# Patient Record
Sex: FEMALE | Race: WHITE | NOT HISPANIC OR LATINO | Employment: UNEMPLOYED | URBAN - METROPOLITAN AREA
[De-identification: names, ages, dates, MRNs, and addresses within clinical notes are randomized per-mention and may not be internally consistent; named-entity substitution may affect disease eponyms.]

---

## 2024-09-11 ENCOUNTER — HOSPITAL ENCOUNTER (EMERGENCY)
Facility: HOSPITAL | Age: 34
Discharge: HOME | End: 2024-09-11
Payer: COMMERCIAL

## 2024-09-11 ENCOUNTER — APPOINTMENT (OUTPATIENT)
Dept: RADIOLOGY | Facility: HOSPITAL | Age: 34
End: 2024-09-11
Payer: COMMERCIAL

## 2024-09-11 VITALS
BODY MASS INDEX: 22.15 KG/M2 | HEIGHT: 63 IN | HEART RATE: 88 BPM | OXYGEN SATURATION: 97 % | RESPIRATION RATE: 20 BRPM | WEIGHT: 125 LBS | TEMPERATURE: 99.7 F

## 2024-09-11 DIAGNOSIS — S62.92XA LEFT HAND FRACTURE, CLOSED, INITIAL ENCOUNTER: Primary | ICD-10-CM

## 2024-09-11 PROCEDURE — 29125 APPL SHORT ARM SPLINT STATIC: CPT | Mod: LT | Performed by: PHYSICIAN ASSISTANT

## 2024-09-11 PROCEDURE — 73110 X-RAY EXAM OF WRIST: CPT | Mod: LEFT SIDE | Performed by: STUDENT IN AN ORGANIZED HEALTH CARE EDUCATION/TRAINING PROGRAM

## 2024-09-11 PROCEDURE — 73110 X-RAY EXAM OF WRIST: CPT | Mod: LT

## 2024-09-11 PROCEDURE — 99283 EMERGENCY DEPT VISIT LOW MDM: CPT

## 2024-09-11 RX ORDER — OXYCODONE AND ACETAMINOPHEN 5; 325 MG/1; MG/1
1 TABLET ORAL EVERY 6 HOURS PRN
Qty: 10 TABLET | Refills: 0 | Status: SHIPPED | OUTPATIENT
Start: 2024-09-11 | End: 2024-09-14

## 2024-09-11 ASSESSMENT — PAIN - FUNCTIONAL ASSESSMENT: PAIN_FUNCTIONAL_ASSESSMENT: 0-10

## 2024-09-11 ASSESSMENT — PAIN DESCRIPTION - LOCATION: LOCATION: WRIST

## 2024-09-11 ASSESSMENT — LIFESTYLE VARIABLES
HAVE YOU EVER FELT YOU SHOULD CUT DOWN ON YOUR DRINKING: NO
TOTAL SCORE: 0
EVER HAD A DRINK FIRST THING IN THE MORNING TO STEADY YOUR NERVES TO GET RID OF A HANGOVER: NO
EVER FELT BAD OR GUILTY ABOUT YOUR DRINKING: NO
HAVE PEOPLE ANNOYED YOU BY CRITICIZING YOUR DRINKING: NO

## 2024-09-11 ASSESSMENT — COLUMBIA-SUICIDE SEVERITY RATING SCALE - C-SSRS
1. IN THE PAST MONTH, HAVE YOU WISHED YOU WERE DEAD OR WISHED YOU COULD GO TO SLEEP AND NOT WAKE UP?: NO
6. HAVE YOU EVER DONE ANYTHING, STARTED TO DO ANYTHING, OR PREPARED TO DO ANYTHING TO END YOUR LIFE?: NO
2. HAVE YOU ACTUALLY HAD ANY THOUGHTS OF KILLING YOURSELF?: NO

## 2024-09-11 ASSESSMENT — PAIN SCALES - GENERAL: PAINLEVEL_OUTOF10: 8

## 2024-09-12 NOTE — ED PROVIDER NOTES
EMERGENCY MEDICINE EVALUATION NOTE    History of Present Illness     Chief Complaint:   Chief Complaint   Patient presents with    Wrist Injury     Walking today outside missed a step landed on L wrist.   Denies hitting head no loc       HPI: Corie Rodrigez is a 34 y.o. female presents with a chief complaint of fall.  She reports that she was walking down some steps onto her garage when she missed a step and landed awkwardly on the left wrist and hand area.  She is been having pain on the left lateral wrist down the hand since the fall.  Patient reports that she is decreased range of motion secondary to pain.  She did not take any at home for symptoms.  Patient denies any other injuries from the incident.  She did not hit her head during the fall.  Patient denies loss of consciousness.  Patient denies any previous surgeries or fractures to left upper extremity.    Previous History   No past medical history on file.  No past surgical history on file.  Social History     Tobacco Use    Smoking status: Never   Vaping Use    Vaping status: Never Used   Substance Use Topics    Alcohol use: Never     No family history on file.  Allergies   Allergen Reactions    Minocycline Shortness of breath     Current Outpatient Medications   Medication Instructions    oxyCODONE-acetaminophen (Percocet) 5-325 mg tablet 1 tablet, oral, Every 6 hours PRN       Physical Exam     Appearance: Alert, oriented , cooperative     Skin: Intact,  dry skin, no lesions, rash, petechiae or purpura.      Eyes: PERRLA, EOMs intact,  Conjunctiva pink     ENT: Hearing grossly intact. Pharynx clear     Neck: Supple. Trachea at midline.     Pulmonary: Clear bilaterally. No rales, rhonchi or wheezing. No accessory muscle use or stridor.     Cardiac: Normal rate and rhythm without murmur     Abdomen: Soft, nontender, active bowel sounds.     Musculoskeletal: Decreased range of motion of left wrist secondary to pain.  Patient is diffuse tenderness and  "swelling along lateral aspect of hand.  Intact sensation in the distal tips of all fingers.  Strong pulse left upper extremity.     Neurological:Cranial nerves II through XII are grossly intact, normal sensation, no weakness, no focal findings identified.     Results   Labs Reviewed - No data to display  XR wrist left 3+ views   Final Result   1. Acute mildly displaced oblique fracture 5th metacarpal diaphysis.   2. Acute nondisplaced oblique fracture of the 4th metacarpal neck.   3. Smoothly marginated osseous thickening along the distal ulnar   metadiaphysis that may relate to a healed fracture or chronic   subperiosteal hematoma from remote injury.        MACRO:   None.        Signed by: Norris Conde 9/11/2024 6:26 PM   Dictation workstation:   [id number]            ED Course & Medical Decision Making   Medications - No data to display  Heart Rate:  [88]   Temperature:  [37.6 °C (99.7 °F)]   Respirations:  [20]   Height:  [160 cm (5' 3\")]   Weight:  [56.7 kg (125 lb)]   Pulse Ox:  [97 %]    ED Course as of 09/11/24 2238   Wed Sep 11, 2024   1857 Updated the patient on the results.  Patient's x-ray does show fractures of the fifth and fourth metatarsal.  Patient not have any obvious new wrist fractures.  Discussed plan of care going forward.  Patient had splint placed at this time.  Patient will be discharged home to follow-up orthopedics as an outpatient.  Patient had pain medication sent over to her pharmacy.  She was encouraged to follow-up with orthopedics return here immediately with any worsening symptoms.  We have discussed signs or symptoms for splint removal for concern for swelling. [CJ]      ED Course User Index  [CJ] Jb Cunha PA-C         Diagnoses as of 09/11/24 2238   Left hand fracture, closed, initial encounter       Procedures   Orthopaedic Injury Treatment - Upper Extremity    Performed by: Jb Cunha PA-C  Authorized by: Jb Cunha PA-C    Consent:     Consent obtained:  " Verbal    Consent given by:  Patient    Risks discussed:  Nerve damage, restricted joint movement, vascular damage, recurrent dislocation and stiffness    Alternatives discussed:  No treatment  Universal protocol:     Patient identity confirmed:  Verbally with patient  Location:     Location:  Wrist    Wrist location:  L wrist  Pre-procedure details:     Pre-procedure imaging:  X-ray    Imaging findings: fracture present      Distal perfusion: normal    Sedation:     Sedation type:  None  Anesthesia:     Anesthesia method:  None  Procedure details:     Immobilization:  Splint    Splint type:  Ulnar gutter    Supplies used:  Elastic bandage, cotton padding and fiberglass  Post-procedure details:     Neurological function: normal      Distal perfusion: normal      Range of motion: unchanged      Procedure completion:  Tolerated well, no immediate complications      Diagnosis     1. Left hand fracture, closed, initial encounter        Disposition   Discharged     ED Prescriptions       Medication Sig Dispense Start Date End Date Auth. Provider    oxyCODONE-acetaminophen (Percocet) 5-325 mg tablet Take 1 tablet by mouth every 6 hours if needed for severe pain (7 - 10) for up to 3 days. 10 tablet 9/11/2024 9/14/2024 Jb Cunha PA-C            Disclaimer: This note was dictated by speech recognition. Minor errors in transcription may be present. Please call if questions.       Jb Cunha PA-C  09/11/24 [id number]

## 2024-09-16 ENCOUNTER — OFFICE VISIT (OUTPATIENT)
Dept: ORTHOPEDIC SURGERY | Facility: HOSPITAL | Age: 34
End: 2024-09-16
Payer: COMMERCIAL

## 2024-09-16 ENCOUNTER — EVALUATION (OUTPATIENT)
Dept: OCCUPATIONAL THERAPY | Facility: HOSPITAL | Age: 34
End: 2024-09-16
Payer: COMMERCIAL

## 2024-09-16 VITALS — HEIGHT: 63 IN | WEIGHT: 125 LBS | BODY MASS INDEX: 22.15 KG/M2

## 2024-09-16 DIAGNOSIS — S62.307A CLOSED FRACTURE OF FIFTH METACARPAL BONE OF LEFT HAND, UNSPECIFIED FRACTURE MORPHOLOGY, INITIAL ENCOUNTER: ICD-10-CM

## 2024-09-16 DIAGNOSIS — S62.305A CLOSED FRACTURE OF FOURTH METACARPAL BONE OF LEFT HAND, UNSPECIFIED FRACTURE MORPHOLOGY, INITIAL ENCOUNTER: ICD-10-CM

## 2024-09-16 DIAGNOSIS — S62.307A CLOSED FRACTURE OF FIFTH METACARPAL BONE OF LEFT HAND: ICD-10-CM

## 2024-09-16 DIAGNOSIS — S62.305A CLOSED FRACTURE OF FOURTH METACARPAL BONE OF LEFT HAND, UNSPECIFIED FRACTURE MORPHOLOGY, INITIAL ENCOUNTER: Primary | ICD-10-CM

## 2024-09-16 DIAGNOSIS — S62.305A: ICD-10-CM

## 2024-09-16 DIAGNOSIS — M25.60 STIFFNESS IN JOINT: Primary | ICD-10-CM

## 2024-09-16 DIAGNOSIS — S62.92XA LEFT HAND FRACTURE, CLOSED, INITIAL ENCOUNTER: ICD-10-CM

## 2024-09-16 PROCEDURE — 99204 OFFICE O/P NEW MOD 45 MIN: CPT | Performed by: ORTHOPAEDIC SURGERY

## 2024-09-16 PROCEDURE — 3008F BODY MASS INDEX DOCD: CPT | Performed by: ORTHOPAEDIC SURGERY

## 2024-09-16 PROCEDURE — 99214 OFFICE O/P EST MOD 30 MIN: CPT | Mod: 25 | Performed by: ORTHOPAEDIC SURGERY

## 2024-09-16 PROCEDURE — 26600 TREAT METACARPAL FRACTURE: CPT | Performed by: ORTHOPAEDIC SURGERY

## 2024-09-16 PROCEDURE — 97760 ORTHOTIC MGMT&TRAING 1ST ENC: CPT | Mod: GO | Performed by: OCCUPATIONAL THERAPIST

## 2024-09-16 PROCEDURE — 97165 OT EVAL LOW COMPLEX 30 MIN: CPT | Mod: GO | Performed by: OCCUPATIONAL THERAPIST

## 2024-09-16 ASSESSMENT — PATIENT HEALTH QUESTIONNAIRE - PHQ9
SUM OF ALL RESPONSES TO PHQ9 QUESTIONS 1 AND 2: 0
2. FEELING DOWN, DEPRESSED OR HOPELESS: NOT AT ALL
1. LITTLE INTEREST OR PLEASURE IN DOING THINGS: NOT AT ALL

## 2024-09-16 ASSESSMENT — PAIN - FUNCTIONAL ASSESSMENT: PAIN_FUNCTIONAL_ASSESSMENT: 0-10

## 2024-09-16 ASSESSMENT — ENCOUNTER SYMPTOMS
LOSS OF SENSATION IN FEET: 0
DEPRESSION: 0
OCCASIONAL FEELINGS OF UNSTEADINESS: 0

## 2024-09-16 ASSESSMENT — PAIN SCALES - GENERAL: PAINLEVEL_OUTOF10: 7

## 2024-09-16 NOTE — PROGRESS NOTES
NPV Left hand fracture seen at ED 9/11/24 Patient fell going down the stairs tried catching herself causing injury to the hand DOI 9/11/24

## 2024-09-16 NOTE — PROGRESS NOTES
34 y.o. female presents today for evaluation of left hand pain after fall. The patient reports he was going down the stairs and fell on her left hand. The DOI is 9/11, 5 days ago. Pain is controlled. Patient reports no numbness and tingling.  Reports no previous surgeries, injections, or trauma to the area.  Reports pain worse with use, better at rest.   Pain dull ache, sharp at times. Splint has been worn as directed.   Right-hand-dominant.    Review of Systems    Constitutional: see HPI, no fever, no chills, not feeling tired, no significant weight gain or weight loss.   Eyes: No vision changes  ENT: no nosebleeds.   Cardiovascular: no chest pain.   Respiratory: no shortness of breath and no cough.   Gastrointestinal: no abdominal pain, no nausea, no vomiting and no diarrhea.   Musculoskeletal: per HPI  Neurological: no headache, no gait disturbances  Psychiatric: no depression and no sleep disturbances.   Endocrine: no muscle weakness and no muscle cramps.   Hematologic/Lymphatic: no swollen glands and no tendency for easy bruising or excessive swelling.     Patient's past medical history, past surgical history, allergies, and medications have been reviewed unless otherwise noted in the chart.     Metacarpal Fracture  Digit: Left ring finger and left small finger inspection:  no evidence of infection, no erythema, positive edema, positive ecchymosis, no significant deformity, Palpation:  compartments are soft, pain with palpation over affected metacarpal, Range of Motion:  finger motion limited by pain, no rotational defect to ring or small finger stability:  unable to assess secondary to pain, Strength:  gross motor function of FDS, FDP and extension intact but strength limited by pain, Skin:  intact, Vascular:  capillary refill <2 seconds distally, Sensation:  sensation intact to light touch distally, Other:  no pain with palpation or motion proximally in the wrist.      Constitutional   General appearance:  Alert and in no acute distress. Well developed, well nourished.    Eyes   External Eye, Conjunctiva and lids: Normal external exam - pupils were equal in size, round, reactive to light (PERRL) with normal accommodation and extraocular movements intact (EOMI).   Ears, Nose, Mouth, and Throat   Hearing: Normal.   Neck   Neck: No neck mass was observed. Supple.   Pulmonary   Respiratory effort: No respiratory distress.   Cardiovascular   Examination of extremities: No peripheral edema.   Psychiatric   Judgment and insight: Intact.   Orientation to person, place, and time: Alert and oriented x 3.       Mood and affect: Normal.      X-ray shows nondisplaced left fourth metacarpal head fracture, and left fifth metacarpal shaft fracture minimally displaced    Left fourth and fifth metacarpal fractures  Based on the history, physical exam and imaging studies above, the patient's presentation is consistent with the above diagnosis.  I had a long discussion with the patient regarding their presentation and the treatment options.  We discussed initial nonoperative versus operative management options as well as potential further diagnostic imaging.  We again discussed her treatment options going forward along with their associated risks and benefits. After thorough discussion, the patient has elected to proceed with conservative management. All questions were answered to the patients satisfaction who seems satisfied with the plan.  They will call the office with any questions/concerns.    Occupational Therapy evaluation and treatment, ulnar gutter splint IP's free forearm-based  Follow-up 4 weeks x-ray - [facility]

## 2024-09-16 NOTE — PROGRESS NOTES
Occupational Therapy    Evaluation/Treatment    Patient Name: Corie Rodrigez  MRN: [id number]  : 1990  Today's Date: 2024     Time Calculation  Start Time: 1205  Stop Time: 1245  Time Calculation (min): 40 min  Visit Number: 1  Therapeutic Procedure Codes:  OT Evaluation Time Entry  OT Evaluation (Low) Time Entry: 25   OT Therapeutic Procedures Time Entry  Orthotic Management Training Time Entry: 15       Subjective   Current Problem:  1. Stiffness in joint        2. Closed fracture of fifth metacarpal bone of left hand  Referral to Occupational Therapy      3. Closed fracture of fourth metacarpal bone of left hand  Referral to Occupational Therapy        General:   OT Received On: 24  General  Reason for Referral: Splint Fabrication -Evaluate and Treat  Referred By: Dr. Montano  Pt reporting she fell down cement steps in garage, landed with LUE on ground. Pt went to ED, xrays (+) for LRF and LSF [facility] fx. ED splinted pt, referred to Dr. Montano. Elected for conservative management. Referred to skilled OT for splint fabrication.   R hand dominant/L injury    Precautions:  Splinting: Patient to wear splint per discussion with therapist,  I have reviewed patients medical history form.     Pain:  Pain Assessment  Pain Assessment: 0-10  0-10 (Numeric) Pain Score: 7  Pt stating she took Tylenol prior to OT appointment. Stating pain has gotten better throughout the day.     Objective   Home Living:   Pt lives with spouse. Able to assist as needed. Pt stating her mother has been assisting. Has 3 y/o daughter at home.  Prior Function:   IND  ADL:   IND - one handed, increased time.    Splinting:  The patient was provided with a custom fabricated ulnar gutter LMF, LRF, and LSF. The splint is forearm based. Wear, care and precautions were reviewed with the patient indicating an understanding.  The patient was encouraged to maximize ROM of unrestricted joints without pushing pain. Handouts provided to the patient.        Sensation:  Intact  Pt said initially she had numbness, however this has gone away.     Outcome Measures:   Quickdash Scores: 36.36%    Therapeutic exercises/activities:  DOI: 9/11/24    Re-check due on: 10/16/24    Exercises: Reps:                   Activities:                    Modalities:            Orthotic Splint fabrication.        Manual:                    Functional review:     Completed on:       OP EDUCATION:  Education  Individual(s) Educated: Patient  Education Provided: Diagnosis & Precautions, Symptom management, Orthotics wearing schedule and precautions, Orthotics and/or prosthetic trainging, Risk and benefits of OT discussed with patient or other, POC discussed and agreed upon  Home Program: Orthotic wearing schedule, care and precautions, AROM, Handout issued  Equipment: [place], Orthotic(s)  Risk and Benefits Discussed with Patient/Caregiver/Other: yes  Patient/Caregiver Demonstrated Understanding: yes  Plan of Care Discussed and Agreed Upon: yes  Patient Response to Education: Patient/Caregiver Verbalized Understanding of Information    Assessment:     OT Assessment Results: Decreased ADL status, Decreased upper extremity range of motion, Decreased upper extremity strength  Pt is a 33 y/o F who presents to this facility with performance deficits in positioning, ROM, and pain limiting ability to complete ADL and IADL tasks. The patient reports a good supportive fit from the splint. The patient indicates an understanding of splint wear, care and precautions and indicates an understanding of the need to achieve maximum ROM of all unrestricted joints. Handouts provided to the patient.     Plan:   Rehab Potential: Good  Plan of Care Agreement: Patient  Pt will follow-up with skilled OT treatment as needed.   Occupational therapy intervention plan to include education/instruction, hot pack, ultrasound, manual therapy, orthotic fitting/training, self-care/home management, therapeutic  exercises, therapeutic activities, and home program.     Goals:  Active       OT Goals       LTG - Patient will indicate/ demonstrate the ability to resume all preinjury ADLs and IADLs without significant limits secondary to decreased ROM, decreased strength and/or pain as indicated by Quickdash score of less than 15%.        Start:  09/16/24    Expected End:  10/14/24            Develop and issue HEP to help maximize ROM, strength and tolerance to help maximize return to all pre-onset activities.        Start:  09/16/24    Expected End:  10/14/24            Patient will indicate/ demonstrate an understanding of splint wear, care and precautions.        Start:  09/16/24    Expected End:  10/14/24            The patient will indicate/demonstrate protective and supportive position of L hand via splinting to help maximize support and the patients ability to participate in IADLs.        Start:  09/16/24    Expected End:  10/14/24            Pain to be less than or equal to 2/10 with greater than or equal to 45 minutes activity.       Start:  09/16/24    Expected End:  10/14/24

## 2024-10-15 DIAGNOSIS — S62.305A CLOSED FRACTURE OF FOURTH METACARPAL BONE OF LEFT HAND, UNSPECIFIED FRACTURE MORPHOLOGY, INITIAL ENCOUNTER: ICD-10-CM

## 2024-10-15 DIAGNOSIS — S62.307A CLOSED FRACTURE OF FIFTH METACARPAL BONE OF LEFT HAND, UNSPECIFIED FRACTURE MORPHOLOGY, INITIAL ENCOUNTER: ICD-10-CM

## 2024-10-15 DIAGNOSIS — S62.92XA LEFT HAND FRACTURE, CLOSED, INITIAL ENCOUNTER: ICD-10-CM

## 2024-10-17 ENCOUNTER — OFFICE VISIT (OUTPATIENT)
Dept: ORTHOPEDIC SURGERY | Facility: HOSPITAL | Age: 34
End: 2024-10-17
Payer: COMMERCIAL

## 2024-10-17 ENCOUNTER — HOSPITAL ENCOUNTER (OUTPATIENT)
Dept: RADIOLOGY | Facility: HOSPITAL | Age: 34
Discharge: HOME | End: 2024-10-17
Payer: COMMERCIAL

## 2024-10-17 VITALS — WEIGHT: 125 LBS | HEIGHT: 63 IN | BODY MASS INDEX: 22.15 KG/M2

## 2024-10-17 DIAGNOSIS — S62.307A CLOSED FRACTURE OF FIFTH METACARPAL BONE OF LEFT HAND, UNSPECIFIED FRACTURE MORPHOLOGY, INITIAL ENCOUNTER: ICD-10-CM

## 2024-10-17 DIAGNOSIS — S62.305A CLOSED FRACTURE OF FOURTH METACARPAL BONE OF LEFT HAND, UNSPECIFIED FRACTURE MORPHOLOGY, INITIAL ENCOUNTER: ICD-10-CM

## 2024-10-17 DIAGNOSIS — S62.92XA LEFT HAND FRACTURE, CLOSED, INITIAL ENCOUNTER: ICD-10-CM

## 2024-10-17 DIAGNOSIS — S62.305A CLOSED FRACTURE OF FOURTH METACARPAL BONE OF LEFT HAND, UNSPECIFIED FRACTURE MORPHOLOGY, INITIAL ENCOUNTER: Primary | ICD-10-CM

## 2024-10-17 PROCEDURE — 99211 OFF/OP EST MAY X REQ PHY/QHP: CPT | Performed by: ORTHOPAEDIC SURGERY

## 2024-10-17 PROCEDURE — 3008F BODY MASS INDEX DOCD: CPT | Performed by: ORTHOPAEDIC SURGERY

## 2024-10-17 PROCEDURE — 73130 X-RAY EXAM OF HAND: CPT | Mod: LEFT SIDE | Performed by: RADIOLOGY

## 2024-10-17 PROCEDURE — 73130 X-RAY EXAM OF HAND: CPT | Mod: LT

## 2024-10-17 NOTE — PROGRESS NOTES
34 y.o. female presents today for follow up of left hand pain after fall. The patient reports he was going down the stairs and fell on her left hand. The DOI is 9/11, 1 month ago. Pain is controlled. Patient reports no numbness and tingling.  Reports no previous surgeries, injections, or trauma to the area.  Reports pain worse with use, better at rest.   Pain dull ache, sharp at times. Splint has been worn as directed.   Right-hand-dominant.  Pain improved, symptoms improving, doing much better.    Review of Systems    Constitutional: see HPI, no fever, no chills, not feeling tired, no significant weight gain or weight loss.   Eyes: No vision changes  ENT: no nosebleeds.   Cardiovascular: no chest pain.   Respiratory: no shortness of breath and no cough.   Gastrointestinal: no abdominal pain, no nausea, no vomiting and no diarrhea.   Musculoskeletal: per HPI  Neurological: no headache, no gait disturbances  Psychiatric: no depression and no sleep disturbances.   Endocrine: no muscle weakness and no muscle cramps.   Hematologic/Lymphatic: no swollen glands and no tendency for easy bruising or excessive swelling.     Patient's past medical history, past surgical history, allergies, and medications have been reviewed unless otherwise noted in the chart.     Metacarpal Fracture  Digit: Left ring finger and left small finger inspection:  no evidence of infection, no erythema, mild edema, no ecchymosis, no significant deformity, Palpation:  compartments are soft, mild pain with palpation over affected metacarpal, Range of Motion: Full extension and full flexion, no rotational defect to ring or small finger stability:  stable Strength:  gross motor function of FDS, FDP and extension intact 5/5 Skin:  intact, Vascular:  capillary refill <2 seconds distally, Sensation:  sensation intact to light touch distally, Other:  no pain with palpation or motion proximally in the wrist.      Constitutional   General appearance: Alert  and in no acute distress. Well developed, well nourished.    Eyes   External Eye, Conjunctiva and lids: Normal external exam - pupils were equal in size, round, reactive to light (PERRL) with normal accommodation and extraocular movements intact (EOMI).   Ears, Nose, Mouth, and Throat   Hearing: Normal.   Neck   Neck: No neck mass was observed. Supple.   Pulmonary   Respiratory effort: No respiratory distress.   Cardiovascular   Examination of extremities: No peripheral edema.   Psychiatric   Judgment and insight: Intact.   Orientation to person, place, and time: Alert and oriented x 3.       Mood and affect: Normal.      X-ray shows nondisplaced left fourth metacarpal head fracture, and left fifth metacarpal shaft fracture minimally displaced, no change in position or alignment, interval healing with callus formation    Left fourth and fifth metacarpal fractures  Based on the history, physical exam and imaging studies above, the patient's presentation is consistent with the above diagnosis.  I had a long discussion with the patient regarding their presentation and the treatment options.  We discussed initial nonoperative versus operative management options as well as potential further diagnostic imaging.  We again discussed her treatment options going forward along with their associated risks and benefits. After thorough discussion, the patient has elected to proceed with conservative management. All questions were answered to the patients satisfaction who seems satisfied with the plan.  They will call the office with any questions/concerns.    Occupational Therapy evaluation and treatment, ulnar gutter splint IP's free forearm-based -wean out over 2 weeks  Vic tape the fingers  Follow-up 4 weeks x-ray - [facility]

## 2024-11-19 DIAGNOSIS — S62.92XA LEFT HAND FRACTURE, CLOSED, INITIAL ENCOUNTER: ICD-10-CM

## 2024-11-21 ENCOUNTER — APPOINTMENT (OUTPATIENT)
Dept: ORTHOPEDIC SURGERY | Facility: HOSPITAL | Age: 34
End: 2024-11-21
Payer: COMMERCIAL

## 2024-11-21 ENCOUNTER — HOSPITAL ENCOUNTER (OUTPATIENT)
Dept: RADIOLOGY | Facility: HOSPITAL | Age: 34
Discharge: HOME | End: 2024-11-21
Payer: COMMERCIAL

## 2024-11-21 DIAGNOSIS — S62.305A CLOSED FRACTURE OF FOURTH METACARPAL BONE OF LEFT HAND, UNSPECIFIED FRACTURE MORPHOLOGY, INITIAL ENCOUNTER: ICD-10-CM

## 2024-11-21 DIAGNOSIS — S62.92XA LEFT HAND FRACTURE, CLOSED, INITIAL ENCOUNTER: ICD-10-CM

## 2024-11-21 DIAGNOSIS — S62.307A CLOSED FRACTURE OF FIFTH METACARPAL BONE OF LEFT HAND, UNSPECIFIED FRACTURE MORPHOLOGY, INITIAL ENCOUNTER: Primary | ICD-10-CM

## 2024-11-21 PROCEDURE — 99211 OFF/OP EST MAY X REQ PHY/QHP: CPT | Performed by: ORTHOPAEDIC SURGERY

## 2024-11-21 PROCEDURE — 73130 X-RAY EXAM OF HAND: CPT | Mod: LT

## 2024-11-21 PROCEDURE — 73130 X-RAY EXAM OF HAND: CPT | Mod: LEFT SIDE | Performed by: RADIOLOGY

## 2024-11-21 NOTE — PROGRESS NOTES
34 y.o. female presents today for follow up of left hand pain after fall. The patient reports he was going down the stairs and fell on her left hand. The DOI is 9/11, 2 months ago. Pain is controlled. Patient reports no numbness and tingling.  Reports no previous surgeries, injections, or trauma to the area.  Reports pain worse with use, better at rest.   Pain dull ache, sharp at times. Splint has been worn as directed.   Right-hand-dominant.  Pain improved, symptoms improving, doing much better.  Reports no pain today, back to doing all of her normal activities.    Review of Systems    Constitutional: see HPI, no fever, no chills, not feeling tired, no significant weight gain or weight loss.   Eyes: No vision changes  ENT: no nosebleeds.   Cardiovascular: no chest pain.   Respiratory: no shortness of breath and no cough.   Gastrointestinal: no abdominal pain, no nausea, no vomiting and no diarrhea.   Musculoskeletal: per HPI  Neurological: no headache, no gait disturbances  Psychiatric: no depression and no sleep disturbances.   Endocrine: no muscle weakness and no muscle cramps.   Hematologic/Lymphatic: no swollen glands and no tendency for easy bruising or excessive swelling.     Patient's past medical history, past surgical history, allergies, and medications have been reviewed unless otherwise noted in the chart.     Metacarpal Fracture  Digit: Left ring finger and left small finger inspection:  no evidence of infection, no erythema, no edema, no ecchymosis, no significant deformity, Palpation:  compartments are soft, no pain with palpation over affected metacarpal, Range of Motion: Full extension and full flexion, no rotational defect to ring or small finger stability:  stable Strength:  gross motor function of FDS, FDP and extension intact 5/5 Skin:  intact, Vascular:  capillary refill <2 seconds distally, Sensation:  sensation intact to light touch distally, Other:  no pain with palpation or motion  proximally in the wrist.      Constitutional   General appearance: Alert and in no acute distress. Well developed, well nourished.    Eyes   External Eye, Conjunctiva and lids: Normal external exam - pupils were equal in size, round, reactive to light (PERRL) with normal accommodation and extraocular movements intact (EOMI).   Ears, Nose, Mouth, and Throat   Hearing: Normal.   Neck   Neck: No neck mass was observed. Supple.   Pulmonary   Respiratory effort: No respiratory distress.   Cardiovascular   Examination of extremities: No peripheral edema.   Psychiatric   Judgment and insight: Intact.   Orientation to person, place, and time: Alert and oriented x 3.       Mood and affect: Normal.      X-ray shows nondisplaced left fourth metacarpal head fracture, and left fifth metacarpal shaft fracture minimally displaced, no change in position or alignment, interval healing with callus formation    Left fourth and fifth metacarpal fractures  Based on the history, physical exam and imaging studies above, the patient's presentation is consistent with the above diagnosis.  I had a long discussion with the patient regarding their presentation and the treatment options.  We discussed initial nonoperative versus operative management options as well as potential further diagnostic imaging.  We again discussed her treatment options going forward along with their associated risks and benefits. After thorough discussion, the patient has elected to proceed with conservative management. All questions were answered to the patients satisfaction who seems satisfied with the plan.  They will call the office with any questions/concerns.    Occupational Therapy evaluation and treatment, ulnar gutter splint IP's free forearm-based - prn  Vic tape the fingers prn  Follow-up 4 weeks x-ray - [facility]

## 2024-12-06 ENCOUNTER — DOCUMENTATION (OUTPATIENT)
Dept: OCCUPATIONAL THERAPY | Facility: HOSPITAL | Age: 34
End: 2024-12-06
Payer: COMMERCIAL

## 2024-12-06 DIAGNOSIS — M25.60 STIFFNESS IN JOINT: Primary | ICD-10-CM

## 2024-12-06 NOTE — PROGRESS NOTES
Occupational Therapy    Discharge Summary    Name: Corie Rodrigez  MRN: [id number]  : 1990  Date: 24    Discharge Summary: OT    Discharge Information: Date of discharge 24, Date of last visit 24, Date of evaluation 24, Number of attended visits 1, Referred by Dr. Montano, and Referred for  Fx    Therapy Summary: Skilled OT services addressed ROM, splinting, and HEP to increase independence with ADL and IADL tasks.    Discharge Status: Pt did not schedule any follow-up OT sessions.      Rehab Discharge Reason: Failed to schedule and/or keep follow-up appointment(s)